# Patient Record
Sex: FEMALE | Race: WHITE
[De-identification: names, ages, dates, MRNs, and addresses within clinical notes are randomized per-mention and may not be internally consistent; named-entity substitution may affect disease eponyms.]

---

## 2017-11-11 ENCOUNTER — HEALTH MAINTENANCE LETTER (OUTPATIENT)
Age: 67
End: 2017-11-11

## 2018-04-24 ENCOUNTER — TRANSFERRED RECORDS (OUTPATIENT)
Dept: HEALTH INFORMATION MANAGEMENT | Facility: CLINIC | Age: 68
End: 2018-04-24

## 2018-05-02 ENCOUNTER — TRANSFERRED RECORDS (OUTPATIENT)
Dept: HEALTH INFORMATION MANAGEMENT | Facility: CLINIC | Age: 68
End: 2018-05-02

## 2018-06-01 NOTE — TELEPHONE ENCOUNTER
FUTURE VISIT INFORMATION      FUTURE VISIT INFORMATION:    Date: 6/5/18    Time: 1630    Location: [facility]  REFERRAL INFORMATION:    Referring provider:  ISA AVILEZ    Referring providers clinic:  [facility]    Reason for visit/diagnosis  DDD    RECORDS REQUESTED FROM:       Clinic name Comments Records Status Imaging Status   [facility] FAXED REQUEST FOR IMAGES AND RECORDS 6/1/18@0915     Count includes the [facility] 6/4/18 FAXED REQUEST TO HAVE MRI SENT VIA [employer]                               RECORDS STATUS        Action    Action Taken CALLED RAGHAV, REQUESTED IMAGES AND RECORDS TO BE SENT     RECORDS RECEIVED FROM: RAGHAV   DATE RECEIVED: 6/4/18   NOTES STATUS DETAILS   OFFICE NOTE from referring provider Received 5/4/18*4/26/18*4/24/18*   OFFICE NOTE from other specialist N/A    DISCHARGE SUMMARY from hospital N/A    DISCHARGE REPORT from the ER N/A    OPERATIVE REPORT N/A    MEDICATION LIST N/A    IMPLANT RECORD/STICKER N/A    LABS     CBC/DIFF N/A    CULTURES N/A    INJECTIONS DONE IN RADIOLOGY N/A    MRI In process 5/2/18   CT SCAN N/A    XRAYS (IMAGES & REPORTS) N/A    TUMOR     PATHOLOGY  Slides & report N/A

## 2018-06-02 ASSESSMENT — ENCOUNTER SYMPTOMS
ARTHRALGIAS: 1
MUSCLE WEAKNESS: 1
MYALGIAS: 1
STIFFNESS: 1
MUSCLE CRAMPS: 1
BACK PAIN: 1

## 2018-06-05 ENCOUNTER — OFFICE VISIT (OUTPATIENT)
Dept: ORTHOPEDICS | Facility: CLINIC | Age: 68
End: 2018-06-05
Payer: COMMERCIAL

## 2018-06-05 ENCOUNTER — PRE VISIT (OUTPATIENT)
Dept: ORTHOPEDICS | Facility: CLINIC | Age: 68
End: 2018-06-05

## 2018-06-05 VITALS
WEIGHT: 186.2 LBS | SYSTOLIC BLOOD PRESSURE: 134 MMHG | HEART RATE: 63 BPM | HEIGHT: 66 IN | BODY MASS INDEX: 29.92 KG/M2 | DIASTOLIC BLOOD PRESSURE: 70 MMHG

## 2018-06-05 DIAGNOSIS — M54.16 SUBACUTE RIGHT LUMBAR RADICULOPATHY: Primary | ICD-10-CM

## 2018-06-05 NOTE — NURSING NOTE
"Reason For Visit:   Chief Complaint   Patient presents with     Consult     The patient is here today with low back pain. She would like a referral to PT. No known injury.        Primary MD: Madyson Arnett  Ref. MD: self    ?  No  Occupation retired.  Currently working? No.  Work status?  Retired.    Smoker: No  Request smoking cessation information: No    /70 (BP Location: Right arm, Patient Position: Sitting)  Pulse 63  Ht 1.676 m (5' 6\")  Wt 84.5 kg (186 lb 3.2 oz)  BMI 30.05 kg/m2    Pain Assessment  Patient Currently in Pain: Yes  0-10 Pain Scale: 4  Primary Pain Location: Hip (and leg on the right)  Pain Descriptors: Aching  Alleviating Factors: Rest  Aggravating Factors: Movement    Oswestry (JENN) Questionnaire    OSWESTRY DISABILITY INDEX 6/2/2018   Count 10   Sum 6   Oswestry Score (%) 12   Some recent data might be hidden            Neck Disability Index (NDI) Questionnaire    No flowsheet data found.                Promis 10 Assessment    No flowsheet data found.             Veronica Pierce, ATC  "

## 2018-06-05 NOTE — MR AVS SNAPSHOT
After Visit Summary   6/5/2018    Ashley Palmer    MRN: [id number]           Patient Information     Date Of Birth          1950        Visit Information        Provider Department      6/5/2018 4:30 PM Thomas Orozco [facility]        Today's Diagnoses     Subacute right lumbar radiculopathy    -  1       Follow-ups after your visit        Additional Services     PHYSICAL THERAPY REFERRAL (External-Prints)       Physical Therapy Referral                  Your next 10 appointments already scheduled     Jul 18, 2018  2:00 PM CDT   [facility] with Ashley Self PT   [facility] ([facility] [place])    [address]   [place] [address] [phone]              Who to contact     Please call your clinic at [phone] to:    Ask questions about your health    Make or cancel appointments    Discuss your medicines    Learn about your test results    Speak to your doctor            Additional Information About Your Visit        [employer] Mult[employer] Information     [employer] gives you secure access to your electronic health record. If you see a primary care provider, you can also send messages to your care team and make appointments. If you have questions, please call your primary care clinic.  If you do not have a primary care provider, please call [phone] and they will assist you.      [employer] is an electronic gateway that provides easy, online access to your medical records. With [employer], you can request a clinic appointment, read your test results, renew a prescription or communicate with your care team.     To access your existing account, please contact your [facility] or call [phone] for assistance.        Care EveryWhere ID     This is your Care EveryWhere ID. This could be used by other organizations to access your [place] medical records  [id number]        Your Vitals Were      "Pulse Height BMI (Body Mass Index)             63 1.676 m (5' 6\") 30.05 kg/m2          Blood Pressure from Last 3 Encounters:   06/05/18 134/70   08/18/16 130/81   05/31/16 126/81    Weight from Last 3 Encounters:   06/05/18 84.5 kg (186 lb 3.2 oz)   08/18/16 96.6 kg (213 lb)   05/31/16 98 kg (216 lb)              We Performed the Following     PHYSICAL THERAPY REFERRAL (External-Prints)        Primary Care Provider Office Phone # Fax #    Madyson WILVER Arnett [phone]       [facility] [address] [place] [address]        Equal Access to Services     MANUELA MENDEZ : Stephen bahenao fEren, waaxda luqadaha, qaybta kaalmada adeegyada, sheri oreilly. So [facility] [phone].    ATENCIÓN: Si habla español, tiene a gardner disposición servicios gratuitos de asistencia lingüística. Llame al [phone].    We comply with applicable federal civil rights laws and [place] laws. We do not discriminate on the basis of race, color, national origin, age, disability, sex, sexual orientation, or gender identity.            Thank you!     Thank you for choosing [facility]  for your care. Our goal is always to provide you with excellent care. Hearing back from our patients is one way we can continue to improve our services. Please take a few minutes to complete the written survey that you may receive in the mail after your visit with us. Thank you!             Your Updated Medication List - Protect others around you: Learn how to safely use, store and throw away your medicines at www.disposemymeds.org.          This list is accurate as of 6/5/18 11:59 PM.  Always use your most recent med list.                   Brand Name Dispense Instructions for use Diagnosis    aspirin 81 MG EC tablet     90 tablet    Take 1 tablet (81 mg) by mouth daily    Factor 5 Leiden mutation, heterozygous (H)       CRESTOR PO      Take 10 mg by mouth daily          "

## 2018-06-05 NOTE — LETTER
6/5/2018       RE: Ashley Palmer  [address]  [facility] [address]     Dear Colleague,    Thank you for referring your patient, Ashley Palmer, to the [facility] at [facility]. Please see a copy of my visit note below.    Chief Complaint:   Chief Complaint   Patient presents with     Consult     The patient is here today with low back pain. She would like a referral to PT. No known injury.        History of Present Illness:  Ashley is a 68 year old female who presents for evaluation of a several month history of intermittent right sided low back pain with radiation to the right proximal lateral thigh without clear precipitating event.  She has not noted any lower extremity weakness or loss of bowel or bladder control.  Symptoms are better with rest and worse with activity.  No prior episodes.  No treatment for this episode.  She is interested in trying physical therapy for this if deemed appropriate.     Past Medical History:  Hyperlipidemia  Obesity  Factor 5 Leiden mutation, heterozygous  Sensorineural hearing loss (SNHL) of both ears  Impaired fasting glucose    Social History:  Social History     Social History     Marital status:      Spouse name: Grayson Barber     Number of children: 2     Years of education: College     Occupational History     Retired      Social History Main Topics     Smoking status: Former Smoker     Years: 30.00     Smokeless tobacco: Never Used      Comment: [id number] (30 yers) 1ppd     Alcohol use 4.2 oz/week     7 Glasses of wine per week     Drug use: No     Sexual activity: Yes     Partners: Female     Birth control/ protection: Other      Comment:  since 2010     Other Topics Concern      Service No     Blood Transfusions No     Caffeine Concern No     Coffee 2 cups a day, no soda     Occupational Exposure No     Retired  (government)     Hobby Hazards No     Cooking,  "art, paints     Sleep Concern No     7-8 hours of sleep a night     Stress Concern No     Coping: Breathing exercises, tolerating stress     Weight Concern No     Weight stable     Special Diet Not Asked     Love to cook, healthy, eat out 5 times a week     Back Care No     Exercise Yes     Water aerobics 3 x week, walking prgram     Bike Helmet Not Asked     NA     Seat Belt Yes     Self-Exams No     Discussed     Social History Narrative    Growing up: Born [place], [place], raised [place], raised by both parents, 1 sister in [place], relatives in [place], has 1 sister and 1 brother in [place], close with brother        Relationship:  to Berny Barber ,  from [place] [phone],  [place] [phone] ( of Hodgkins lymphoma)        Living situation: Lives part time in [place] with , sometimes travels to [place]        Support network: , sons (2)       Family History:  Family History   Problem Relation Age of Onset     Nephrolithiasis Mother      Hyperlipidemia Mother      Dementia Mother      Hernia Son      Crohn Disease Son      Hernia Son      Hernia Brother      Hyperlipidemia Brother      Hyperlipidemia Sister      Cerebrovascular Disease Sister      Substance Abuse Sister      Addicted to Vicodin after TBI           Physical Exam:  Blood pressure 134/70, pulse 63, height 1.676 m (5' 6\"), weight 84.5 kg (186 lb 3.2 oz), not currently breastfeeding.  Oswestry (JENN) Questionnaire    OSWESTRY DISABILITY INDEX 2018   Count 9   Sum 9   Oswestry Score (%) 20   Some recent data might be hidden        General:  Alert, cooperative and in no acute distress.  Mood and affect are appropriate.   Cardiovascular: Posterior tibial pulses are palpable bilaterally.  Back: Lumbar flexion is full.  Lumbar extension is full.  Straight leg raise is negative on the right.    Neuro: 1+ Achilles and patellar reflexes bilaterally.   Sensation is impaired to light touch over the dorsum of the right foot in an " apparent L5 distribution.  Otherwise sensation is intact to light touch over the L3-S1 dermatomes bilaterally.  5/5 strength throughout the bilateral lower extremities.     Imaging:  Outside Lumbar MRI from [facility] in [place], [place] dated 5/2/2018 was reviewed  Evidence of right sided L5 nerve root impingement from a L5-S1 disc protrusion was noted.     Diagnostic Impression:  Right lumbar radiculopathy    Plan:  Recommended trial of physical therapy utilizing the Everette technique.  If symptoms persist, she may call to request a right TFESI targeting the L5 nerve root.  Follow up if she develops any weakness in right lower extremity or 2 weeks after injection if required.          Again, thank you for allowing me to participate in the care of your patient.      Sincerely,    Thomas Orozco MD

## 2018-06-07 ENCOUNTER — THERAPY VISIT (OUTPATIENT)
Dept: PHYSICAL THERAPY | Facility: CLINIC | Age: 68
End: 2018-06-07
Payer: COMMERCIAL

## 2018-06-07 DIAGNOSIS — M54.41 RIGHT-SIDED LOW BACK PAIN WITH RIGHT-SIDED SCIATICA: Primary | ICD-10-CM

## 2018-06-07 PROCEDURE — 97530 THERAPEUTIC ACTIVITIES: CPT | Mod: GP | Performed by: PHYSICAL THERAPIST

## 2018-06-07 PROCEDURE — 97161 PT EVAL LOW COMPLEX 20 MIN: CPT | Mod: GP | Performed by: PHYSICAL THERAPIST

## 2018-06-07 PROCEDURE — G8978 MOBILITY CURRENT STATUS: HCPCS | Mod: GP | Performed by: PHYSICAL THERAPIST

## 2018-06-07 PROCEDURE — G8978 MOBILITY CURRENT STATUS: HCPCS | Mod: GP

## 2018-06-07 PROCEDURE — G8979 MOBILITY GOAL STATUS: HCPCS | Mod: GP

## 2018-06-07 PROCEDURE — 97110 THERAPEUTIC EXERCISES: CPT | Mod: GP | Performed by: PHYSICAL THERAPIST

## 2018-06-07 PROCEDURE — G8979 MOBILITY GOAL STATUS: HCPCS | Mod: GP | Performed by: PHYSICAL THERAPIST

## 2018-06-07 NOTE — PROGRESS NOTES
[facility] Initial Evaluation -- Lumbar    Date: June 7, 2018  Ashley Palmer is a 68 year old female with a R radiculopathy condition.   Referral: Ortho  Work mechanical stresses:  n/a  Employment status:  retired  Leisure mechanical stresses: walking everyday  Functional disability score (JENN/STarT Back):  See flowsheets  VAS score (0-10): 5/10 worse, 3/10 currently, 1/10 best  Patient goals/expectations:  walking    HISTORY:    Present symptoms: R LB, groin, ant thigh  Pain quality (sharp/shooting/stabbing/aching/burning/cramping):  Throbbing, burning   Paresthesia (yes/no):  no    Present since (onset date): 3 months ago. MD orders 6/5/18  Symptoms (improving/unchanging/worsening):  Worsening(more often, larger area)    Symptoms commenced as a result of: insidious  Condition occurred in the following environment:   n/a     Symptoms at onset (back/thigh/leg): R groin  Constant symptoms (back/thigh/leg): R back  Intermittent symptoms (back/thigh/leg): R thigh, groin    Symptoms are made worse with the following: Always Rising, Always Standing, Always Walking, Always Lying and Always On the move   Symptoms are made better with the following: Other - advil    Disturbed sleep (yes/no):  occasionally Sleeping postures (prone/sup/side R/L): R & L    Previous episodes (0/1-5/6-10/11+): 1-5 Year of first episode: 5-6 yrs ago    Previous history: LBP  Previous treatments: chiropractor      Specific Questions:  Cough/Sneeze/Strain (pos/neg): neg  Bowel/Bladder (normal/abnormal): norm  Gait (normal/abnormal): abnormal, limp occasionally, weak on R  Medications (nil/NSAIDS/analg/steroids/anticoag/other):  Other - cholesterol, advil  Medical allergies:  Sulfa, lovenox  General health (excellent/good/fair/poor):  excellent  Pertinent medical history:  Overweight, Smoking(prev.) and pain at night  Imaging (None/Xray/MRI/Other):  MRI. L5 disc protrusion  Recent or major surgery (yes/no):  no  Night pain  (yes/no): no  Accidents (yes/no): no  Unexplained weight loss (yes/no): no  Barriers at home: no  Other red flags: no    EXAMINATION    Posture:   Sitting (good/fair/poor): fair(crosses R leg over)  Standing (good/fair/poor):good  Lordosis (red/acc/normal): norm  Correction of posture (better/worse/no effect): better    Lateral Shift (right/left/nil): nil  Relevant (yes/no):  no  Other Observations: no    Neurological:    Motor deficit:  Single leg squat, calf raise weaker on R  Reflexes:  NT  Sensory deficit:  NT  Dural signs:  Slump + on R    Movement Loss:   Dontrell Mod Min Nil Pain   Flexion    x    Extension   x     Side Gliding R    x    Side Gliding L   x  Prod ant thigh     Test Movements:   During: produces, abolishes, increases, decreases, no effect, centralizing, peripheralizing   After: better, worse, no better, no worse, no effect, centralized, peripheralized    Pretest symptoms standing: R LB, R hip, R thigh   Symptoms During Symptoms After ROM increased ROM decreased No Effect   FIS  No Effect    No Effect         Rep FIS No Effect    No Effect      x   EIS Increased    No Worse         Rep EIS Increased No Worse      R decreased SLS, calf raise   Pretest symptoms lying: Supine: mild R hip Prone: mild R hip   Symptoms During Symptoms After ROM increased ROM decreased No Effect   KEVAN No Effect    No Effect         Rep KEVAN No Effect    No Effect      x   EIL Increased(ERP 1st rep)    No Worse         Rep EIL decreased    Centralized(to R upper butt)      decr pain SGL       Static Tests:  Sitting slouched:  NT  Sitting erect:  NT  Standing slouched NT  Standing erect:  NT  Lying prone in extension:  NT Long sitting:  NT    Other Tests:     Provisional Classification:  Derangement - Asymmetrical, unilateral, symptoms above knee    Principle of Management:  Education:  Posture, effect of neutral spine, use of lumbar roll, effect of repeated movement, centralisation vs peripheralisation, HEP  Equipment  provided:  none  Mechanical therapy (Y/N):  Y   Extension principle:  EIL lock, blow, sag x10, every 2-3 hrs  Lateral Principle:  no  Flexion principle:  no  Other:  no    ASSESSMENT/PLAN:    Patient is a 68 year old female with lumbar complaints.    Patient has the following significant findings with corresponding treatment plan.                Diagnosis 1:  R lumbar radiculopathy  Pain -  manual therapy, education, directional preference exercise and home program  Decreased strength - therapeutic exercise, therapeutic activities and home program  Decreased ROM - therapeutic exercise, home program  Decreased function - therapeutic activities and home program  Impaired posture - neuro re-education and home program    Therapy Evaluation Codes:   1) History comprised of:   Personal factors that impact the plan of care:      None.    Comorbidity factors that impact the plan of care are:      Overweight and Pain at night/rest.     Medications impacting care: Cholesterol, advil.  2) Examination of Body Systems comprised of:   Body structures and functions that impact the plan of care:      Lumbar spine.   Activity limitations that impact the plan of care are:      Stairs, Standing, Walking and Rising.  3) Clinical presentation characteristics are:   Stable/Uncomplicated.  4) Decision-Making    Low complexity using standardized patient assessment instrument and/or measureable assessment of functional outcome.  Cumulative Therapy Evaluation is: Low complexity.    Previous and current functional limitations:  (See Goal Flow Sheet for this information)    Short term and Long term goals: (See Goal Flow Sheet for this information)     Communication ability:  Patient appears to be able to clearly communicate and understand verbal and written communication and follow directions correctly.  Treatment Explanation - The following has been discussed with the patient:   RX ordered/plan of care  Anticipated outcomes  Possible risks  and side effects  This patient would benefit from PT intervention to resume normal activities.   Rehab potential is excellent.    Frequency:  1 X week, once daily  Duration:  for 6 weeks  Discharge Plan:  Achieve all LTG.  Independent in home treatment program.  Reach maximal therapeutic benefit.    Please refer to the daily flowsheet for treatment today, total treatment time and time spent performing 1:1 timed codes.

## 2018-06-14 ENCOUNTER — THERAPY VISIT (OUTPATIENT)
Dept: PHYSICAL THERAPY | Facility: CLINIC | Age: 68
End: 2018-06-14
Payer: COMMERCIAL

## 2018-06-14 DIAGNOSIS — M54.41 RIGHT-SIDED LOW BACK PAIN WITH RIGHT-SIDED SCIATICA: ICD-10-CM

## 2018-06-14 PROCEDURE — 97110 THERAPEUTIC EXERCISES: CPT | Mod: GP | Performed by: PHYSICAL THERAPIST

## 2018-06-14 PROCEDURE — 97530 THERAPEUTIC ACTIVITIES: CPT | Mod: GP | Performed by: PHYSICAL THERAPIST

## 2018-06-21 ENCOUNTER — THERAPY VISIT (OUTPATIENT)
Dept: PHYSICAL THERAPY | Facility: CLINIC | Age: 68
End: 2018-06-21
Payer: COMMERCIAL

## 2018-06-21 DIAGNOSIS — M54.41 RIGHT-SIDED LOW BACK PAIN WITH RIGHT-SIDED SCIATICA: ICD-10-CM

## 2018-06-21 PROCEDURE — 97530 THERAPEUTIC ACTIVITIES: CPT | Mod: GP | Performed by: PHYSICAL THERAPIST

## 2018-06-21 PROCEDURE — 97110 THERAPEUTIC EXERCISES: CPT | Mod: GP | Performed by: PHYSICAL THERAPIST

## 2018-06-28 ENCOUNTER — THERAPY VISIT (OUTPATIENT)
Dept: PHYSICAL THERAPY | Facility: CLINIC | Age: 68
End: 2018-06-28
Payer: COMMERCIAL

## 2018-06-28 DIAGNOSIS — M54.41 RIGHT-SIDED LOW BACK PAIN WITH RIGHT-SIDED SCIATICA: ICD-10-CM

## 2018-06-28 PROCEDURE — 97110 THERAPEUTIC EXERCISES: CPT | Mod: GP | Performed by: PHYSICAL THERAPIST

## 2018-06-28 PROCEDURE — 97530 THERAPEUTIC ACTIVITIES: CPT | Mod: GP | Performed by: PHYSICAL THERAPIST

## 2018-07-05 ENCOUNTER — THERAPY VISIT (OUTPATIENT)
Dept: PHYSICAL THERAPY | Facility: CLINIC | Age: 68
End: 2018-07-05
Payer: COMMERCIAL

## 2018-07-05 DIAGNOSIS — M54.41 RIGHT-SIDED LOW BACK PAIN WITH RIGHT-SIDED SCIATICA: ICD-10-CM

## 2018-07-05 PROCEDURE — 97530 THERAPEUTIC ACTIVITIES: CPT | Mod: GP | Performed by: PHYSICAL THERAPIST

## 2018-07-05 PROCEDURE — 97110 THERAPEUTIC EXERCISES: CPT | Mod: GP | Performed by: PHYSICAL THERAPIST

## 2018-07-14 NOTE — PROGRESS NOTES
Chief Complaint:   Chief Complaint   Patient presents with     Consult     The patient is here today with low back pain. She would like a referral to PT. No known injury.        History of Present Illness:  Ashley is a 68 year old female who presents for evaluation of a several month history of intermittent right sided low back pain with radiation to the right proximal lateral thigh without clear precipitating event.  She has not noted any lower extremity weakness or loss of bowel or bladder control.  Symptoms are better with rest and worse with activity.  No prior episodes.  No treatment for this episode.  She is interested in trying physical therapy for this if deemed appropriate.     Past Medical History:  Hyperlipidemia  Obesity  Factor 5 Leiden mutation, heterozygous  Sensorineural hearing loss (SNHL) of both ears  Impaired fasting glucose    Social History:  Social History     Social History     Marital status:      Spouse name: Grayson Barber     Number of children: 2     Years of education: College     Occupational History     Retired      Social History Main Topics     Smoking status: Former Smoker     Years: 30.00     Smokeless tobacco: Never Used      Comment: [id number] (30 yers) 1ppd     Alcohol use 4.2 oz/week     7 Glasses of wine per week     Drug use: No     Sexual activity: Yes     Partners: Female     Birth control/ protection: Other      Comment:  since 2010     Other Topics Concern      Service No     Blood Transfusions No     Caffeine Concern No     Coffee 2 cups a day, no soda     Occupational Exposure No     Retired  ([employer])     Hobby Hazards No     Cooking, art, paints     Sleep Concern No     7-8 hours of sleep a night     Stress Concern No     Coping: Breathing exercises, tolerating stress     Weight Concern No     Weight stable     Special Diet Not Asked     Love to cook, healthy, eat out 5 times a week     Back Care No     Exercise Yes     Water  "aerobics 3 x week, walking prgram     Bike Helmet Not Asked     NA     Seat Belt Yes     Self-Exams No     Discussed     Social History Narrative    Growing up: [place], [place], raised [place], raised by both parents, 1 sister in [place], relatives in [place], has 1 sister and 1 brother in [place], close with brother        Relationship:  to Berny Barber ,  from [facility] [phone],  [place] [phone] ( of Hodgkins lymphoma)        Living situation: Lives part time in [place] with , sometimes travels to [place]        Support network: , sons (2)       Family History:  Family History   Problem Relation Age of Onset     Nephrolithiasis Mother      Hyperlipidemia Mother      Dementia Mother      Hernia Son      Crohn Disease Son      Hernia Son      Hernia Brother      Hyperlipidemia Brother      Hyperlipidemia Sister      Cerebrovascular Disease Sister      Substance Abuse Sister      Addicted to Vicodin after TBI           Physical Exam:  Blood pressure 134/70, pulse 63, height 1.676 m (5' 6\"), weight 84.5 kg (186 lb 3.2 oz), not currently breastfeeding.  Oswestry (JENN) Questionnaire    OSWESTRY DISABILITY INDEX 2018   Count 9   Sum 9   Oswestry Score (%) 20   Some recent data might be hidden        General:  Alert, cooperative and in no acute distress.  Mood and affect are appropriate.   Cardiovascular: Posterior tibial pulses are palpable bilaterally.  Back: Lumbar flexion is full.  Lumbar extension is full.  Straight leg raise is negative on the right.    Neuro: 1+ Achilles and patellar reflexes bilaterally.   Sensation is impaired to light touch over the dorsum of the right foot in an apparent L5 distribution.  Otherwise sensation is intact to light touch over the L3-S1 dermatomes bilaterally.  5/5 strength throughout the bilateral lower extremities.     Imaging:  [facility] [place] [place] dated 2018 was reviewed  Evidence of right sided L5 nerve root impingement " from a L5-S1 disc protrusion was noted.     Diagnostic Impression:  Right lumbar radiculopathy    Plan:  Recommended trial of physical therapy utilizing the Everette technique.  If symptoms persist, she may call to request a right TFESI targeting the L5 nerve root.  Follow up if she develops any weakness in right lower extremity or 2 weeks after injection if required.       Review of Systems:  Answers for HPI/ROS submitted by the patient on 6/2/2018   General Symptoms: No  Skin Symptoms: No  HENT Symptoms: No  EYE SYMPTOMS: No  HEART SYMPTOMS: No  LUNG SYMPTOMS: No  INTESTINAL SYMPTOMS: No  URINARY SYMPTOMS: No  GYNECOLOGIC SYMPTOMS: No  BREAST SYMPTOMS: No  SKELETAL SYMPTOMS: Yes  BLOOD SYMPTOMS: No  NERVOUS SYSTEM SYMPTOMS: No  MENTAL HEALTH SYMPTOMS: No  Back pain: Yes  Muscle aches: Yes  Joint pain: Yes  Bone pain: Yes  Muscle cramps: Yes  Muscle weakness: Yes  Joint stiffness: Yes

## 2018-11-17 ENCOUNTER — HEALTH MAINTENANCE LETTER (OUTPATIENT)
Age: 68
End: 2018-11-17

## 2019-08-06 ENCOUNTER — DOCUMENTATION ONLY (OUTPATIENT)
Dept: CARE COORDINATION | Facility: CLINIC | Age: 69
End: 2019-08-06

## 2019-08-07 NOTE — TELEPHONE ENCOUNTER
FUTURE VISIT INFORMATION      FUTURE VISIT INFORMATION:    Date: 8/15/19    Time: 10:00 am ENT  9:00 am Audiology    Location: [facility]  REFERRAL INFORMATION:    Referring provider:  self     Referring providers clinic:  Self    Reason for visit/diagnosis  Right ear pain and ringing    RECORDS REQUESTED FROM:       Clinic name Comments Records Status Imaging Status         [facility] Audiogram-12/6/16 [facility] Audiogram-6/18/15 Epic                        8/7/19-Called patient to talk about MARIO.  We need it for all organizations in Care Everywhere.  I am emailing the MARIO to [email or web address]  STEVEN  8/7/19-Received signed MARIO.      Action    Action Taken 8/7/2019-3:06 PM-Faxed request for records to [facility].  STEVEN

## 2019-08-15 ENCOUNTER — OFFICE VISIT (OUTPATIENT)
Dept: AUDIOLOGY | Facility: CLINIC | Age: 69
End: 2019-08-15
Payer: COMMERCIAL

## 2019-08-15 ENCOUNTER — OFFICE VISIT (OUTPATIENT)
Dept: OTOLARYNGOLOGY | Facility: CLINIC | Age: 69
End: 2019-08-15
Payer: COMMERCIAL

## 2019-08-15 ENCOUNTER — PRE VISIT (OUTPATIENT)
Dept: OTOLARYNGOLOGY | Facility: CLINIC | Age: 69
End: 2019-08-15

## 2019-08-15 VITALS
HEART RATE: 67 BPM | OXYGEN SATURATION: 100 % | WEIGHT: 202 LBS | SYSTOLIC BLOOD PRESSURE: 139 MMHG | RESPIRATION RATE: 19 BRPM | HEIGHT: 65 IN | DIASTOLIC BLOOD PRESSURE: 78 MMHG | BODY MASS INDEX: 33.66 KG/M2

## 2019-08-15 DIAGNOSIS — H93.11 TINNITUS, RIGHT: ICD-10-CM

## 2019-08-15 DIAGNOSIS — H90.3 SENSORINEURAL HEARING LOSS (SNHL) OF BOTH EARS: Primary | ICD-10-CM

## 2019-08-15 DIAGNOSIS — M26.609 TMJ (TEMPOROMANDIBULAR JOINT SYNDROME): ICD-10-CM

## 2019-08-15 DIAGNOSIS — H90.3 SENSORY HEARING LOSS, BILATERAL: Primary | ICD-10-CM

## 2019-08-15 DIAGNOSIS — H92.01 OTALGIA, RIGHT: Primary | ICD-10-CM

## 2019-08-15 ASSESSMENT — MIFFLIN-ST. JEOR: SCORE: 1436.53

## 2019-08-15 ASSESSMENT — PAIN SCALES - GENERAL: PAINLEVEL: MILD PAIN (3)

## 2019-08-15 NOTE — NURSING NOTE
"Chief Complaint   Patient presents with     Consult     right ear pain and tinnitus      Blood pressure 139/78, pulse 67, resp. rate 19, height 1.65 m (5' 4.96\"), weight 91.6 kg (202 lb), SpO2 100 %, not currently breastfeeding.    Sai Mukherjee LPN    "

## 2019-08-15 NOTE — PROGRESS NOTES
AUDIOLOGY REPORT    SUMMARY: Audiology visit completed. See audiogram for results.      RECOMMENDATIONS: Follow-up with ENT.    Indigo Hagen M.S.  Audiology Doctoral Extern  License #[id number]      I was present with the patient for the entire Audiology appointment including all procedures/testing performed by the AuD student, and agree with the student s assessment and plan as documented.      Roge Carroll, [facility]  Licensed Audiologist  [place] #[id number]

## 2019-08-15 NOTE — LETTER
8/15/2019       RE: Ashley Palmer  [address]  [facility] [place] [address]     Dear Colleague,    Thank you for referring your patient, Ashley Palmer, to the [facility] EAR NOSE AND THROAT at [facility]. Please see a copy of my visit note below.    [facility] New Patient Visit Note        Otolaryngology        August 15, 2019    Referring Provider:  Self         HPI:  Ashley Palmer is a 69 year old female who presents for evaluation of right ear tinnitus and pain.    Ashley reports that she noticed symptoms in May 2019.  At the time she notes the symptoms, she and her  were discussing driving back from [place] to [place] and possibly purchasing a new motor home.  She states that she originally thought this was all stress related but, now that she is been back and things have settled down, the symptoms have continued.  It was a pain that caught her attention first and then she started noticing the high-pitched ringing.  She knows it is for sure in the right side but she sometimes thinks she hears some ringing and has some discomfort on the left side.    There is not been any drainage from the ear, hearing changes, sore throat, trouble swallowing, hoarseness of voice, numbness of the face or lymphadenopathy.    She does have a history of exposures to loud sounds.  She loves going to live concerts and goes to 2 to 3/year.  She occasionally uses ear protection at the expense but not regularly.    She is a former smoker.  She smokes cigarettes for 30 years, approximately a pack per day.  She quit 25 years ago.  There is no e-cigarette use.  She uses alcohol socially.  She is retired and lives in [place] for 8 to 9 months out of the year.    ROS:  10 point review of systems completed and is negative except for those listed above    PMH:   Past Medical History:   Diagnosis Date     Crohn's disease (H) [id number]    Treated with Cisneros seal  tea and Pentasa     DVT (deep venous thrombosis) (H) 2006    Treated with heparin and warfarin for 3 months     Endometriosis     Had hysterectomy     Factor 5 Leiden mutation, heterozygous (H)      Hyperlipidemia      Obesity      Refractive error      Varicella zoster virus (VZV) IgG antibody positive in cerebrospinal fluid     Bra line left side       PSH:   Past Surgical History:   Procedure Laterality Date     APPENDECTOMY       CHOLECYSTECTOMY       GYN SURGERY      Total hysterectomy     HYSTERECTOMY SUPRACERVICAL, BILATERAL SALPINGO-OOPHORECTOMY, COMBINED      Hysterectomy for fibroids     ORTHOPEDIC SURGERY  2015    Left hand 2nd finger tendon repair     ORTHOPEDIC SURGERY      Right hand 2nd finger surgery       FamH:   Family History   Problem Relation Age of Onset     Nephrolithiasis Mother      Hyperlipidemia Mother      Dementia Mother      Hernia Son      Crohn's Disease Son      Hernia Son      Hernia Brother      Hyperlipidemia Brother      Hyperlipidemia Sister      Cerebrovascular Disease Sister      Substance Abuse Sister         Addicted to Vicodin after TBI     Hypertension Sister      Cerebrovascular Disease Sister      Substance Abuse Sister        SocH:   Social History     Tobacco Use     Smoking status: Former Smoker     Packs/day: 0.00     Years: 30.00     Pack years: 0.00     Last attempt to quit: 1988     Years since quittin.6     Smokeless tobacco: Never Used     Tobacco comment: 2612-0043 (30 yers) 1ppd   Substance Use Topics     Alcohol use: Yes     Alcohol/week: 4.2 oz     Drug use: No     Retired    Medications:   Current Outpatient Medications   Medication     aspirin 81 MG EC tablet     Rosuvastatin Calcium (CRESTOR PO)     No current facility-administered medications for this visit.        Allergies:     Allergies   Allergen Reactions     Lovenox [Enoxaparin] Swelling     Sulfa Drugs Swelling         Physical Exam:   /78   Pulse 67   Resp  "19   Ht 1.65 m (5' 4.96\")   Wt 91.6 kg (202 lb)   SpO2 100%   BMI 33.65 kg/m       Constitutional: The patient was unaccompanied, well-groomed, and in no acute distress.    Head: Normocephalic and atraumatic. Pain to palpation over the masseter and the ptyergoidon the right.  No jaw clicking while opening/closing  Eyes: Pupils were equal and reactive.  Extraocular movement intact.    Ears: Pinnae and tragus non-tender.  EACs and TMs were clear under microscopic exam.   Mouth: Mucosa pink and moist, tonsils non-erythematous, no exudates, uvula midline  Neck: No lymphadenopathy in the neck.  No palpable thyroid.  Normal range of motion  Respiratory: Breathing comfortably without stridor or exertion of accessory muscles.   Skin: Normal:  warm and pink without rash  Neurologic: Alert and oriented x 3.  CN's III-XII within normal limits.  Voice normal.   Psychiatric: The patient's affect was calm, cooperative, and appropriate.    Communication: Normal; communicates verbally, normal voice quality.        Audiogram August 15, 2019: Normal hearing sloping to moderate SNHL bilaterally, with the right being slightly worse than the left. Re:12/6/16 thresholds have decreased 5-20 dB from 1k to 8 kHz bilaterally. Tymps: WNL bilaterally Reflexes: Right Ipsi/Contra and Left Ipsi: Present WNL Left Contra: Present at elevated levels        Assessment/Plan:     1. Sensorineural hearing loss (SNHL) of both ears and right-sided tinnitus  Bilateral sensorineural hearing loss but today it shows a change/decrease in 5 to 20 dB remote 4096-9072 range.  We discussed that she does meet criteria for amplification and she could have hearing aids.  She does not feel that she needs them at this time because she does okay in social situations.  We discussed that the tinnitus is related to the lack of sound inputs in those high-frequency hearing ranges.  We discussed that hearing aids may be beneficial to decreasing the sound, we also talked " about masking devices and biofeedback.  Offered to send her to the tinnitus support clinic but she declines stating that she was going to be going back to [place].  She will look into that when she is in [place].    2. TMJ (temporomandibular joint syndrome)  Patient with symptoms and examination consistent with TMJ syndrome.  Offered to refer her to the TMJ program.  Unfortunately, they are leaving to go back to [place] in September.  She will take the information with her and speak to a dentist on there to see if there is someone that she can see in [place].  We did discuss using warm compresses,  gentle massage and stretching of the muscles of chewing.  She is going to look some of these up online and then follow-up once in [place].  Patient with known           Betty Mike PA-C  Otolaryngology  Head & Neck Surgery  [phone]

## 2019-08-15 NOTE — PROGRESS NOTES
[facility] New Patient Visit Note        Otolaryngology        August 15, 2019    Referring Provider:  Self         HPI:  Ashley Palmer is a 69 year old female who presents for evaluation of right ear tinnitus and pain.    Ashley reports that she noticed symptoms in May 2019.  At the time she notes the symptoms, she and her  were discussing driving back from [place] to [place] and possibly purchasing a new motor home.  She states that she originally thought this was all stress related but, now that she is been back and things have settled down, the symptoms have continued.  It was a pain that caught her attention first and then she started noticing the high-pitched ringing.  She knows it is for sure in the right side but she sometimes thinks she hears some ringing and has some discomfort on the left side.    There is not been any drainage from the ear, hearing changes, sore throat, trouble swallowing, hoarseness of voice, numbness of the face or lymphadenopathy.    She does have a history of exposures to loud sounds.  She loves going to live concerts and goes to 2 to 3/year.  She occasionally uses ear protection at the expense but not regularly.    She is a former smoker.  She smokes cigarettes for 30 years, approximately a pack per day.  She quit 25 years ago.  There is no e-cigarette use.  She uses alcohol socially.  She is retired and lives in [place] for 8 to 9 months out of the year.    ROS:  10 point review of systems completed and is negative except for those listed above    PMH:   Past Medical History:   Diagnosis Date     Crohn's disease (H) [id number]    Treated with Cisneros seal tea and Pentasa     DVT (deep venous thrombosis) (H) 2006    Treated with heparin and warfarin for 3 months     Endometriosis     Had hysterectomy     Factor 5 Leiden mutation, heterozygous (H)      Hyperlipidemia 1990     Obesity      Refractive error      Varicella zoster virus (VZV) IgG antibody  "positive in cerebrospinal fluid     Bra line left side       PSH:   Past Surgical History:   Procedure Laterality Date     APPENDECTOMY       CHOLECYSTECTOMY       GYN SURGERY      Total hysterectomy     HYSTERECTOMY SUPRACERVICAL, BILATERAL SALPINGO-OOPHORECTOMY, COMBINED      Hysterectomy for fibroids     ORTHOPEDIC SURGERY  2015    Left hand 2nd finger tendon repair     ORTHOPEDIC SURGERY      Right hand 2nd finger surgery       FamH:   Family History   Problem Relation Age of Onset     Nephrolithiasis Mother      Hyperlipidemia Mother      Dementia Mother      Hernia Son      Crohn's Disease Son      Hernia Son      Hernia Brother      Hyperlipidemia Brother      Hyperlipidemia Sister      Cerebrovascular Disease Sister      Substance Abuse Sister         Addicted to Vicodin after TBI     Hypertension Sister      Cerebrovascular Disease Sister      Substance Abuse Sister        SocH:   Social History     Tobacco Use     Smoking status: Former Smoker     Packs/day: 0.00     Years: 30.00     Pack years: 0.00     Last attempt to quit: 1988     Years since quittin.6     Smokeless tobacco: Never Used     Tobacco comment: 9037-4410 (30 yers) 1ppd   Substance Use Topics     Alcohol use: Yes     Alcohol/week: 4.2 oz     Drug use: No     Retired    Medications:   Current Outpatient Medications   Medication     aspirin 81 MG EC tablet     Rosuvastatin Calcium (CRESTOR PO)     No current facility-administered medications for this visit.        Allergies:     Allergies   Allergen Reactions     Lovenox [Enoxaparin] Swelling     Sulfa Drugs Swelling         Physical Exam:   /78   Pulse 67   Resp 19   Ht 1.65 m (5' 4.96\")   Wt 91.6 kg (202 lb)   SpO2 100%   BMI 33.65 kg/m      Constitutional: The patient was unaccompanied, well-groomed, and in no acute distress.    Head: Normocephalic and atraumatic. Pain to palpation over the masseter and the ptyergoidon the right.  No jaw clicking " while opening/closing  Eyes: Pupils were equal and reactive.  Extraocular movement intact.    Ears: Pinnae and tragus non-tender.  EACs and TMs were clear under microscopic exam.   Mouth: Mucosa pink and moist, tonsils non-erythematous, no exudates, uvula midline  Neck: No lymphadenopathy in the neck.  No palpable thyroid.  Normal range of motion  Respiratory: Breathing comfortably without stridor or exertion of accessory muscles.   Skin: Normal:  warm and pink without rash  Neurologic: Alert and oriented x 3.  CN's III-XII within normal limits.  Voice normal.   Psychiatric: The patient's affect was calm, cooperative, and appropriate.    Communication: Normal; communicates verbally, normal voice quality.        Audiogram August 15, 2019: Normal hearing sloping to moderate SNHL bilaterally, with the right being slightly worse than the left. Re:12/6/16 thresholds have decreased 5-20 dB from 1k to 8 kHz bilaterally. Tymps: WNL bilaterally Reflexes: Right Ipsi/Contra and Left Ipsi: Present WNL Left Contra: Present at elevated levels        Assessment/Plan:     1. Sensorineural hearing loss (SNHL) of both ears and right-sided tinnitus  Bilateral sensorineural hearing loss but today it shows a change/decrease in 5 to 20 dB remote 8033-1780 range.  We discussed that she does meet criteria for amplification and she could have hearing aids.  She does not feel that she needs them at this time because she does okay in social situations.  We discussed that the tinnitus is related to the lack of sound inputs in those high-frequency hearing ranges.  We discussed that hearing aids may be beneficial to decreasing the sound, we also talked about masking devices and biofeedback.  Offered to send her to the tinnitus support clinic but she declines stating that she was going to be going back to [place].  She will look into that when she is in [place].    2. TMJ (temporomandibular joint syndrome)  Patient with symptoms and examination  consistent with TMJ syndrome.  Offered to refer her to the TMJ program.  Unfortunately, they are leaving to go back to [place] in September.  She will take the information with her and speak to a dentist on there to see if there is someone that she can see in [place].  We did discuss using warm compresses,  gentle massage and stretching of the muscles of chewing.  She is going to look some of these up online and then follow-up once in [place].  Patient with known           Betty Mike PA-C  Otolaryngology  Head & Neck Surgery  [phone]

## 2019-08-15 NOTE — PATIENT INSTRUCTIONS
"Ashley Palmer,    It was a pleasure to see you today.    1. You were seen in the ENT Clinic today by Betty Mike PA-C    If you have any questions or concerns after your appointment, please call   - Option 1: ENT Clinic: [phone]    2. For the pain, you have TMJ syndrome.  This is tightness of the muscles of chewing around the jaw.  Warm compresses, gentle massage and range of motion exercises may be helpful.    Sometimes a formal TMJ physical therapy program is needed.  I can refer you if you decide you want to do this.  Just call the clinic.    3.  The ear ringing is part of the high frequency hearing loss.    Hearing aids may help this. There are also \"masking\" devices that could be used.  Biofeedback may also be helpful.    If you want to consider hearing aids, call and I will place a referral.      Thank you,  Betty Mike PA-C  Otolaryngology  Head & Neck Surgery  [phone]              "

## 2019-10-03 ENCOUNTER — HEALTH MAINTENANCE LETTER (OUTPATIENT)
Age: 69
End: 2019-10-03

## 2019-12-16 ENCOUNTER — HEALTH MAINTENANCE LETTER (OUTPATIENT)
Age: 69
End: 2019-12-16

## 2021-01-15 ENCOUNTER — HEALTH MAINTENANCE LETTER (OUTPATIENT)
Age: 71
End: 2021-01-15

## 2021-09-05 ENCOUNTER — HEALTH MAINTENANCE LETTER (OUTPATIENT)
Age: 71
End: 2021-09-05

## 2022-02-20 ENCOUNTER — HEALTH MAINTENANCE LETTER (OUTPATIENT)
Age: 72
End: 2022-02-20

## 2022-06-04 ENCOUNTER — TELEPHONE ENCOUNTER (OUTPATIENT)
Dept: URBAN - METROPOLITAN AREA CLINIC 68 | Facility: CLINIC | Age: 72
End: 2022-06-04

## 2022-06-04 RX ORDER — SODIUM SULFATE, POTASSIUM SULFATE, MAGNESIUM SULFATE 17.5; 3.13; 1.6 G/ML; G/ML; G/ML
SOLUTION, CONCENTRATE ORAL AS DIRECTED
Qty: 1 | Refills: 0 | OUTPATIENT
Start: 2018-01-08 | End: 2018-01-09

## 2022-06-04 RX ORDER — PREDNISONE 10 MG/1
TABLET ORAL TAKE AS DIRECTED
Qty: 32 | Refills: 0 | OUTPATIENT
Start: 2018-02-05 | End: 2018-03-07

## 2022-06-05 ENCOUNTER — TELEPHONE ENCOUNTER (OUTPATIENT)
Dept: URBAN - METROPOLITAN AREA CLINIC 68 | Facility: CLINIC | Age: 72
End: 2022-06-05

## 2022-06-25 ENCOUNTER — TELEPHONE ENCOUNTER (OUTPATIENT)
Age: 72
End: 2022-06-25

## 2022-06-25 RX ORDER — PREDNISONE 10 MG/1
TABLET ORAL TAKE AS DIRECTED
Qty: 32 | Refills: 0 | OUTPATIENT
Start: 2018-02-05 | End: 2018-03-07

## 2022-06-25 RX ORDER — SODIUM SULFATE, POTASSIUM SULFATE, MAGNESIUM SULFATE 17.5; 3.13; 1.6 G/ML; G/ML; G/ML
SOLUTION, CONCENTRATE ORAL AS DIRECTED
Qty: 1 | Refills: 0 | OUTPATIENT
Start: 2018-01-08 | End: 2018-01-09

## 2022-06-26 ENCOUNTER — TELEPHONE ENCOUNTER (OUTPATIENT)
Age: 72
End: 2022-06-26

## 2022-06-26 RX ORDER — EAR PLUGS
CHOLESTEROL MED(    DAILY ) ACTIVE -HX ENTRY EACH OTIC (EAR) DAILY
Status: ACTIVE | COMMUNITY
Start: 2018-02-19

## 2022-10-23 ENCOUNTER — HEALTH MAINTENANCE LETTER (OUTPATIENT)
Age: 72
End: 2022-10-23

## 2023-04-02 ENCOUNTER — HEALTH MAINTENANCE LETTER (OUTPATIENT)
Age: 73
End: 2023-04-02